# Patient Record
Sex: FEMALE | Race: WHITE | NOT HISPANIC OR LATINO | Employment: UNEMPLOYED | URBAN - METROPOLITAN AREA
[De-identification: names, ages, dates, MRNs, and addresses within clinical notes are randomized per-mention and may not be internally consistent; named-entity substitution may affect disease eponyms.]

---

## 2022-01-17 ENCOUNTER — LAB (OUTPATIENT)
Dept: LAB | Facility: CLINIC | Age: 67
End: 2022-01-17
Payer: COMMERCIAL

## 2022-01-17 ENCOUNTER — VIRTUAL VISIT (OUTPATIENT)
Dept: FAMILY MEDICINE | Facility: CLINIC | Age: 67
End: 2022-01-17
Payer: COMMERCIAL

## 2022-01-17 DIAGNOSIS — E03.9 ACQUIRED HYPOTHYROIDISM: Primary | ICD-10-CM

## 2022-01-17 DIAGNOSIS — Z13.1 SCREENING FOR DIABETES MELLITUS: ICD-10-CM

## 2022-01-17 DIAGNOSIS — E03.9 ACQUIRED HYPOTHYROIDISM: ICD-10-CM

## 2022-01-17 DIAGNOSIS — Z13.220 LIPID SCREENING: ICD-10-CM

## 2022-01-17 LAB
CHOLEST SERPL-MCNC: 259 MG/DL
FASTING STATUS PATIENT QL REPORTED: YES
FASTING STATUS PATIENT QL REPORTED: YES
GLUCOSE BLD-MCNC: 99 MG/DL (ref 70–99)
HDLC SERPL-MCNC: 79 MG/DL
LDLC SERPL CALC-MCNC: 161 MG/DL
NONHDLC SERPL-MCNC: 180 MG/DL
T4 FREE SERPL-MCNC: 0.98 NG/DL (ref 0.76–1.46)
TRIGL SERPL-MCNC: 94 MG/DL
TSH SERPL DL<=0.005 MIU/L-ACNC: 14.37 MU/L (ref 0.4–4)

## 2022-01-17 PROCEDURE — 84439 ASSAY OF FREE THYROXINE: CPT

## 2022-01-17 PROCEDURE — 82947 ASSAY GLUCOSE BLOOD QUANT: CPT

## 2022-01-17 PROCEDURE — 99203 OFFICE O/P NEW LOW 30 MIN: CPT | Mod: 95 | Performed by: FAMILY MEDICINE

## 2022-01-17 PROCEDURE — 36415 COLL VENOUS BLD VENIPUNCTURE: CPT

## 2022-01-17 PROCEDURE — 80061 LIPID PANEL: CPT

## 2022-01-17 PROCEDURE — 84443 ASSAY THYROID STIM HORMONE: CPT

## 2022-01-17 RX ORDER — LEVOTHYROXINE SODIUM 75 UG/1
75 TABLET ORAL DAILY
COMMUNITY
End: 2022-01-17

## 2022-01-17 RX ORDER — LEVOTHYROXINE SODIUM 25 UG/1
25 TABLET ORAL DAILY
Qty: 30 TABLET | Refills: 1 | Status: SHIPPED | OUTPATIENT
Start: 2022-01-17 | End: 2022-03-07

## 2022-01-17 NOTE — PROGRESS NOTES
Janeth is a 66 year old who is being evaluated via a billable video visit.      How would you like to obtain your AVS? MyChart  If the video visit is dropped, the invitation should be resent by: Text to cell phone: [phone]  Will anyone else be joining your video visit? No    Video Start Time: 9:00 am     Assessment & Plan     Acquired hypothyroidism, off Levothyroxine since August  - TSH with free T4 reflex  - Will await labs to resume Levothyroxine.     Lipid screening  - Lipid panel reflex to direct LDL Fasting    Screening for diabetes mellitus  - Glucose        Return in about 3 months (around 4/17/2022) for Annual Wellness Visit at earliest convenience..    Kiesha Lama MD  [facility] [place] Fowler is a 66 year old who presents for the following health issues     HPI     New patient.   Previous PCP: [facility] in [place], would like to transfer care due to change in her insurance.    She is in need of refills on her medication she has been off levothyroxine since August.     Hypothyroidism Follow-up    Since last visit, patient describes the following symptoms: hair loss    Denies having any other issues or concerns.     HEALTH CARE MAINTENANCE: Due for an Annual Wellness Visit.       Review of Systems   Constitutional, HEENT, cardiovascular, pulmonary, gi and gu systems are negative, except as otherwise noted.      Objective           Vitals:  No vitals were obtained today due to virtual visit.    Physical Exam   GENERAL: Healthy, alert and no distress  EYES: Eyes grossly normal to inspection.  No discharge or erythema, or obvious scleral/conjunctival abnormalities.  RESP: No audible wheeze, cough, or visible cyanosis.  No visible retractions or increased work of breathing.    SKIN: Visible skin clear. No significant rash, abnormal pigmentation or lesions.  NEURO: Cranial nerves grossly intact.  Mentation and speech appropriate for age.  PSYCH: Mentation appears normal,  affect normal/bright, judgement and insight intact, normal speech and appearance well-groomed.    DATA  Future labs ordered.       Video-Visit Details    Type of service:  Video Visit    Video End Time:9:20 am     Originating Location (pt. Location): Home    Distant Location (provider location):  [facility] [place]     Platform used for Video Visit: [employer]og[employer]

## 2022-02-06 ENCOUNTER — HEALTH MAINTENANCE LETTER (OUTPATIENT)
Age: 67
End: 2022-02-06

## 2022-02-09 DIAGNOSIS — E03.9 ACQUIRED HYPOTHYROIDISM: ICD-10-CM

## 2022-02-10 RX ORDER — LEVOTHYROXINE SODIUM 25 UG/1
TABLET ORAL
Qty: 30 TABLET | Refills: 1 | OUTPATIENT
Start: 2022-02-10

## 2022-02-10 NOTE — TELEPHONE ENCOUNTER
Patient should have another 30 day refill on file for her Levothyroxine (SYNTHROID/LEVOTHROID) 25 MCG tablet at [facility] [id number] [place] [facility] - [place], [place] - [address]. Last written on 1/7/22 for 30 tablets with 1 refill.     Patient was instructed  On 1/17/22 to return for repeat lab TSH in 1-2 months.     I left a detailed message on Janeth's personal voice mail (message identified her name) informing her of the refill at the pharmacy and that she needs to call to schedule a Lab visit.     Aditi Dallas RN BSN  [facility]

## 2022-03-06 DIAGNOSIS — E03.9 ACQUIRED HYPOTHYROIDISM: ICD-10-CM

## 2022-03-07 NOTE — TELEPHONE ENCOUNTER
Called patient to help schedule lab appointment for repeat thyroid lab.  Patient stated she will schedule but will need to call back after she looks at her schedule.  Patient asked that provider refill #30 so that she does not run out of med.      Janeth-     Your labs showed that your thyroid function is underactive. Let's start Levothyroxine at 25 mcg and repeat labs in 1-2 months.      Rx sent.      Cholesterol was high, were you fasting for the labs today?      It was a pleasure meeting you!     Kiesha Lama M.D   Written by Kiesha Lama MD on 1/17/2022  9:18 PM CST  Seen by patient Janeth BERYL Marty on 1/18/2022  2:38 PM      TSH   Date Value Ref Range Status   01/17/2022 14.37 (H) 0.40 - 4.00 mU/L Final

## 2022-03-09 RX ORDER — LEVOTHYROXINE SODIUM 25 UG/1
TABLET ORAL
Qty: 30 TABLET | Refills: 0 | Status: SHIPPED | OUTPATIENT
Start: 2022-03-09 | End: 2022-03-20

## 2022-03-17 ENCOUNTER — TELEPHONE (OUTPATIENT)
Dept: FAMILY MEDICINE | Facility: CLINIC | Age: 67
End: 2022-03-17
Payer: COMMERCIAL

## 2022-03-17 DIAGNOSIS — E03.9 ACQUIRED HYPOTHYROIDISM: ICD-10-CM

## 2022-03-19 NOTE — TELEPHONE ENCOUNTER
Routing refill request to provider for review/approval because:  Labs out of range:    TSH   Date Value Ref Range Status   01/17/2022 14.37 (H) 0.40 - 4.00 mU/L Final

## 2022-03-20 RX ORDER — LEVOTHYROXINE SODIUM 25 UG/1
TABLET ORAL
Qty: 90 TABLET | Refills: 0 | Status: SHIPPED | OUTPATIENT
Start: 2022-03-20

## 2022-03-21 ENCOUNTER — MYC MEDICAL ADVICE (OUTPATIENT)
Dept: FAMILY MEDICINE | Facility: CLINIC | Age: 67
End: 2022-03-21
Payer: COMMERCIAL

## 2022-03-21 NOTE — TELEPHONE ENCOUNTER
Called [phone] (home).  '  Did patient answer the phone: No, left a message on voicemail to return call to the [facility] at [phone].    Also, sent patient a mychart message.'    Gini RN,BSN  Triage Nurse  [facility]: [facility]

## 2022-03-22 NOTE — TELEPHONE ENCOUNTER
Left message on identified voice mail for patient that requested script was refilled to the pharmacy and a reminder she is due for labs and to call clinic if any questions.  [phone]  Hannah Talavera RN  [facility]

## 2022-10-03 ENCOUNTER — HEALTH MAINTENANCE LETTER (OUTPATIENT)
Age: 67
End: 2022-10-03

## 2023-02-12 ENCOUNTER — HEALTH MAINTENANCE LETTER (OUTPATIENT)
Age: 68
End: 2023-02-12

## 2024-03-09 ENCOUNTER — HEALTH MAINTENANCE LETTER (OUTPATIENT)
Age: 69
End: 2024-03-09